# Patient Record
Sex: MALE | Race: BLACK OR AFRICAN AMERICAN | NOT HISPANIC OR LATINO | Employment: FULL TIME | URBAN - METROPOLITAN AREA
[De-identification: names, ages, dates, MRNs, and addresses within clinical notes are randomized per-mention and may not be internally consistent; named-entity substitution may affect disease eponyms.]

---
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
## 2025-04-24 ENCOUNTER — APPOINTMENT (OUTPATIENT)
Dept: GENERAL RADIOLOGY | Facility: CLINIC | Age: 20
End: 2025-04-24
Attending: EMERGENCY MEDICINE
Payer: COMMERCIAL

## 2025-04-24 ENCOUNTER — HOSPITAL ENCOUNTER (EMERGENCY)
Facility: CLINIC | Age: 20
Discharge: HOME | End: 2025-04-24
Attending: EMERGENCY MEDICINE
Payer: COMMERCIAL

## 2025-04-24 VITALS
SYSTOLIC BLOOD PRESSURE: 104 MMHG | HEART RATE: 78 BPM | RESPIRATION RATE: 18 BRPM | OXYGEN SATURATION: 100 % | WEIGHT: 127.43 LBS | TEMPERATURE: 98 F | DIASTOLIC BLOOD PRESSURE: 78 MMHG

## 2025-04-24 DIAGNOSIS — R06.00 DYSPNEA, UNSPECIFIED TYPE: ICD-10-CM

## 2025-04-24 LAB
ATRIAL RATE - MUSE: 74 BPM
DIASTOLIC BLOOD PRESSURE - MUSE: NORMAL MMHG
INTERPRETATION ECG - MUSE: NORMAL
P AXIS - MUSE: 65 DEGREES
PR INTERVAL - MUSE: 140 MS
QRS DURATION - MUSE: 84 MS
QT - MUSE: 342 MS
QTC - MUSE: 379 MS
R AXIS - MUSE: 79 DEGREES
SYSTOLIC BLOOD PRESSURE - MUSE: NORMAL MMHG
T AXIS - MUSE: 64 DEGREES
VENTRICULAR RATE- MUSE: 74 BPM

## 2025-04-24 PROCEDURE — 250N000013 HC RX MED GY IP 250 OP 250 PS 637: Performed by: EMERGENCY MEDICINE

## 2025-04-24 PROCEDURE — 93005 ELECTROCARDIOGRAM TRACING: CPT

## 2025-04-24 PROCEDURE — 94640 AIRWAY INHALATION TREATMENT: CPT

## 2025-04-24 PROCEDURE — 71046 X-RAY EXAM CHEST 2 VIEWS: CPT

## 2025-04-24 PROCEDURE — 99284 EMERGENCY DEPT VISIT MOD MDM: CPT | Mod: 25

## 2025-04-24 RX ORDER — ALBUTEROL SULFATE 90 UG/1
2 INHALANT RESPIRATORY (INHALATION) EVERY 6 HOURS PRN
Qty: 18 G | Refills: 0 | Status: SHIPPED | OUTPATIENT
Start: 2025-04-24

## 2025-04-24 RX ORDER — ALBUTEROL SULFATE 90 UG/1
2 INHALANT RESPIRATORY (INHALATION) ONCE
Status: COMPLETED | OUTPATIENT
Start: 2025-04-24 | End: 2025-04-24

## 2025-04-24 RX ADMIN — ALBUTEROL SULFATE 2 PUFF: 90 AEROSOL, METERED RESPIRATORY (INHALATION) at 02:54

## 2025-04-24 ASSESSMENT — ACTIVITIES OF DAILY LIVING (ADL): ADLS_ACUITY_SCORE: 41

## 2025-04-24 ASSESSMENT — COLUMBIA-SUICIDE SEVERITY RATING SCALE - C-SSRS
2. HAVE YOU ACTUALLY HAD ANY THOUGHTS OF KILLING YOURSELF IN THE PAST MONTH?: NO
1. IN THE PAST MONTH, HAVE YOU WISHED YOU WERE DEAD OR WISHED YOU COULD GO TO SLEEP AND NOT WAKE UP?: NO
6. HAVE YOU EVER DONE ANYTHING, STARTED TO DO ANYTHING, OR PREPARED TO DO ANYTHING TO END YOUR LIFE?: NO

## 2025-04-24 NOTE — ED PROVIDER NOTES
Emergency Department Note      History of Present Illness     Chief Complaint   Shortness of Breath      HPI   Hollie Prado is a 19 year old male presenting with shortness of breath.  Patient reports for the past few years having intermittent shortness of breath.  He reports being prescribed an albuterol inhaler in the past which seemed to help.  He presents today as he felt as though he could not take a deep breath at night and he was becoming more anxious about this.  He denies any fever, cough, chest pain, abdominal pain, nausea, vomiting, black/bloody stools.  No recent long trips or travel or history blood clots. No history underlying lung pathology.  He denies any smoking or drug use.    Independent Historian   None    Review of External Notes   8/6/24 office visit for SOB    Past Medical History     Medical History and Problem List   No past medical history on file.    Medications   bacitracin (BACITRACIN) ointment  ibuprofen (ADVIL,MOTRIN) 100 MG/5ML suspension        Surgical History   Past Surgical History:   Procedure Laterality Date    APPENDECTOMY OPEN N/A 10/14/2019    Procedure: APPENDECTOMY, OPEN;  Surgeon: Marni Campos MD;  Location:  OR       Physical Exam     Patient Vitals for the past 24 hrs:   BP Temp Temp src Pulse Resp SpO2 Weight   04/24/25 0102 123/80 98  F (36.7  C) Temporal 90 18 99 % 57.8 kg (127 lb 6.8 oz)     Physical Exam  Nursing note and vitals reviewed.  Constitutional: Well nourished. Resting comfortably.   Eyes: Conjunctiva normal.  Pupils are equal, round, and reactive to light.   ENT: Nose normal. Mucous membranes pink and moist.  No posterior oropharyngeal erythema/exudate. Uvula midline  Neck: Normal range of motion.  CVS: Normal rate, regular rhythm.  Normal heart sounds.    Pulmonary: Lungs slightly diminished  GI: Abdomen soft. Nontender, nondistended. No rigidity or guarding.    MSK: No calf tenderness or swelling.  Neuro: Alert. Follows simple commands.  Skin:  Skin is warm and dry. No rash noted.   Psychiatric: Normal affect.       Diagnostics     Lab Results   Labs Ordered and Resulted from Time of ED Arrival to Time of ED Departure - No data to display    Imaging   XR Chest 2 Views   Final Result   IMPRESSION: Negative chest.          EKG   ECG results from 04/24/25   EKG 12 lead     Value    Systolic Blood Pressure     Diastolic Blood Pressure     Ventricular Rate 74    Atrial Rate 74    AR Interval 140    QRS Duration 84        QTc 379    P Axis 65    R AXIS 79    T Axis 64    Interpretation ECG      Sinus rhythm  Normal ECG  No previous ECGs available           Independent Interpretation   CXR: No pneumothorax or infiltrate.    ED Course      Medications Administered   Medications   albuterol (PROVENTIL HFA/VENTOLIN HFA) inhaler (2 puffs Inhalation $Given 4/24/25 4371)       Procedures   Procedures     Discussion of Management   None    ED Course        Additional Documentation  None    Medical Decision Making / Diagnosis     CMS Diagnoses: None    MIPS       None    [facility]   Hollie Prado is a 19 year old male presenting with ongoing shortness of breath that has been intermittent for years.  He is nontoxic, in no significant distress on arrival.  EKG without focal ischemia or underlying arrhythmia including Brugada, WPW or prolonged QTc.  He denies any active chest pain.  I do not feel emergent blood work is warranted at this point in time as I doubt profound anemia or significant electrolyte derangement.  PERC negative, I doubt PE.  Chest x-ray without focal pneumonia, fluid overload, widened mediastinum or pneumothorax.  Patient with slightly diminished lung sounds.  He was given albuterol and reported significant symptom improvement.  No clinical evidence on exam to suggest pharyngitis, peritonsillar abscess, retropharyngeal abscess or epiglottitis.  I did discuss patient may have some underlying component of reactive airway disease.  I did recommend close PCP  follow-up as he may benefit from additional pulmonary testing in the outpatient setting.  Return precautions given, all questions addressed.    Disposition   The patient was discharged.     Diagnosis     ICD-10-CM    1. Dyspnea, unspecified type  R06.00            Discharge Medications   Discharge Medication List as of 4/24/2025  3:24 AM        START taking these medications    Details   albuterol (PROAIR HFA/PROVENTIL HFA/VENTOLIN HFA) 108 (90 Base) MCG/ACT inhaler Inhale 2 puffs into the lungs every 6 hours as needed for shortness of breath, wheezing or cough., Disp-18 g, R-0, [facility] may dispense brand covered by insurance (Proair, or proventil or ventolin or generic albuterol inhaler)               DO Sarah Silvestre Lindsey E, DO  04/24/25 0436

## (undated) DEVICE — SUCTION CANISTER MEDIVAC LINER 3000ML W/LID 65651-530

## (undated) DEVICE — SU MONOCRYL 4-0 P-3 18" UND Y494G

## (undated) DEVICE — LINEN FULL SHEET 5511

## (undated) DEVICE — LINEN TOWEL PACK X10 5473

## (undated) DEVICE — DRSG STERI STRIP 1/2X4" R1547

## (undated) DEVICE — SU UMBILICAL TAPE .125X30" U16G

## (undated) DEVICE — SU VICRYL 0 TIE 12X18" J906G

## (undated) DEVICE — SPONGE LAP 4X18" X8415

## (undated) DEVICE — Device

## (undated) DEVICE — LINEN TOWEL PACK X5 5464

## (undated) DEVICE — NDL 18GA 1.5" 305196

## (undated) DEVICE — TUBING SUCTION 12"X1/4" N612

## (undated) DEVICE — ESU GROUND PAD ADULT W/CORD E7507

## (undated) DEVICE — DRAPE LAP W/ARMBOARD 29410

## (undated) DEVICE — STPL LINEAR CUT 55MM TLC55

## (undated) DEVICE — PREP CHLORAPREP 26ML TINTED ORANGE  260815

## (undated) DEVICE — BAG CLEAR TRASH 1.3M 39X33" P4040C

## (undated) DEVICE — SOL NACL 0.9% IRRIG 1000ML BOTTLE 2F7124

## (undated) DEVICE — GLOVE PROTEXIS POWDER FREE 6.5 ORTHOPEDIC 2D73ET65

## (undated) DEVICE — PACK MINOR CUSTOM RIDGES SBA32RMRMA

## (undated) DEVICE — SU VICRYL 0 CT-2 27" J334H

## (undated) DEVICE — DECANTER VIAL 2006S

## (undated) DEVICE — LINEN HALF SHEET 5512

## (undated) DEVICE — NDL 22GA 1.5"

## (undated) DEVICE — BLADE CLIPPER 3M 9670

## (undated) DEVICE — SOL NACL 0.9% 20ML VIAL

## (undated) RX ORDER — KETOROLAC TROMETHAMINE 30 MG/ML
INJECTION, SOLUTION INTRAMUSCULAR; INTRAVENOUS
Status: DISPENSED
Start: 2019-10-14

## (undated) RX ORDER — CEFAZOLIN SODIUM 1 G/3ML
INJECTION, POWDER, FOR SOLUTION INTRAMUSCULAR; INTRAVENOUS
Status: DISPENSED
Start: 2019-10-14

## (undated) RX ORDER — BUPIVACAINE HYDROCHLORIDE 2.5 MG/ML
INJECTION, SOLUTION EPIDURAL; INFILTRATION; INTRACAUDAL
Status: DISPENSED
Start: 2019-10-14

## (undated) RX ORDER — DEXAMETHASONE SODIUM PHOSPHATE 4 MG/ML
INJECTION, SOLUTION INTRA-ARTICULAR; INTRALESIONAL; INTRAMUSCULAR; INTRAVENOUS; SOFT TISSUE
Status: DISPENSED
Start: 2019-10-14

## (undated) RX ORDER — FENTANYL CITRATE 50 UG/ML
INJECTION, SOLUTION INTRAMUSCULAR; INTRAVENOUS
Status: DISPENSED
Start: 2019-10-14

## (undated) RX ORDER — LIDOCAINE HYDROCHLORIDE 10 MG/ML
INJECTION, SOLUTION EPIDURAL; INFILTRATION; INTRACAUDAL; PERINEURAL
Status: DISPENSED
Start: 2019-10-14

## (undated) RX ORDER — PROPOFOL 10 MG/ML
INJECTION, EMULSION INTRAVENOUS
Status: DISPENSED
Start: 2019-10-14

## (undated) RX ORDER — GLYCOPYRROLATE 0.2 MG/ML
INJECTION INTRAMUSCULAR; INTRAVENOUS
Status: DISPENSED
Start: 2019-10-14

## (undated) RX ORDER — ONDANSETRON 2 MG/ML
INJECTION INTRAMUSCULAR; INTRAVENOUS
Status: DISPENSED
Start: 2019-10-14

## (undated) RX ORDER — NEOSTIGMINE METHYLSULFATE 1 MG/ML
VIAL (ML) INJECTION
Status: DISPENSED
Start: 2019-10-14